# Patient Record
(demographics unavailable — no encounter records)

---

## 2024-12-11 NOTE — ASSESSMENT
[FreeTextEntry1] : # ACC/AHA Stage C, chronic HFrEF  -Etiology: Ischemic - s/p CABG. LVEF 20-25% improved to 45-50% -NYHA class I-II symptoms  -Clinically appears euvolemic w/ warm extremities.  -GDMT: C/w Spironolactone 25mg daily, Toprol XL 50mg daily, and Farxiga 10 mg daily. Plan to start Entresto if repeat labs allow. Pt aware to call the office if SBP<90 or if he experiences LH/dizziness.  -Diuretics: Currently euvolemic off loop diuretic.  -Labs: No recent labs. Repeat labs ordered today.  -Device: Not indicated given EF 45-50% -The patient was educated regarding the diagnosis, treatment and natural progression of heart failure.  -HF education booklet provided to patient. Lifestyle modifications including following a low sodium and fluid restricted diet were discussed.  -Educated regarding importance of checking daily weight and keeping a log. Instructed to notify our office for weight gain >2lbs in 24hrs or 5lbs in 5 days. -Regular physical activity is encouraged as tolerated.  -Plan to order repeat TTE at next visit -Pt is currently enrolled in cardiac rehab at [facility] (started late October 2024) -He has resumed working part time as a [employer]  # CAD -Denies angina -C/w daily BB, ASA and high intensity statin therapy as prescribed -Mgmt per Dr. Rowley ([facility])  # Embolic CVA/LV thrombus -AC: Eliquis was discontinued by Dr. Rowley in September 2024 -Follows with hematology, Factor V Leiden negative   # NIDDM -on SGLT2i -He will repeat endocrine testing in January -Will f/u with Endocrine for continued management.  RTO in 3 months with Dr. Roque.

## 2024-12-11 NOTE — DISCUSSION/SUMMARY
[FreeTextEntry1] : 58 y/o male with recently diagnosed HFrEF (LVEF 20-25%), multivessel CAD, LV thrombus, CVA, NIDDM, former smoker who was referred for evaluation of his heart failure.  RHC performed on 4/23/24 demonstrated low filling pressures and preserved cardiac index.  He underwent CABG x 4 and removal of LV mass on 6/26/24. Clinically he appears euvolemic on exam w/ warm extremities. His LVEF improved to 45-50% post CABG. Will optimize GDMT as tolerated. Plan as above.  [EKG obtained to assist in diagnosis and management of assessed problem(s)] : EKG obtained to assist in diagnosis and management of assessed problem(s)

## 2024-12-11 NOTE — PHYSICAL EXAM
[Moves all extremities] : moves all extremities [Alert and Oriented] : alert and oriented [Well Developed] : well developed [No Acute Distress] : no acute distress [Normal Venous Pressure] : normal venous pressure [Normal S1, S2] : normal S1, S2 [No Murmur] : no murmur [No Rub] : no rub [Clear Lung Fields] : clear lung fields [No Respiratory Distress] : no respiratory distress  [Soft] : abdomen soft [No Masses/organomegaly] : no masses/organomegaly [de-identified] : did not assess gait [de-identified] : RLE edema [de-identified] : warm, dry

## 2024-12-11 NOTE — ASSESSMENT
[FreeTextEntry1] : # ACC/AHA Stage C, chronic HFrEF  -Etiology: Ischemic - s/p CABG. LVEF 20-25% improved to 45-50% -NYHA class I-II symptoms  -Clinically appears euvolemic w/ warm extremities.  -GDMT: C/w Spironolactone 25mg daily, Toprol XL 50mg daily, and Farxiga 10 mg daily. Plan to start Entresto if repeat labs allow. Pt aware to call the office if SBP<90 or if he experiences LH/dizziness.  -Diuretics: Currently euvolemic off loop diuretic.  -Labs: No recent labs. Repeat labs ordered today.  -Device: Not indicated given EF 45-50% -The patient was educated regarding the diagnosis, treatment and natural progression of heart failure.  -HF education booklet provided to patient. Lifestyle modifications including following a low sodium and fluid restricted diet were discussed.  -Educated regarding importance of checking daily weight and keeping a log. Instructed to notify our office for weight gain >2lbs in 24hrs or 5lbs in 5 days. -Regular physical activity is encouraged as tolerated.  -Plan to order repeat TTE at next visit -Pt is currently enrolled in cardiac rehab at [facility] (started late October 2024) -He has resumed working part time as a [email or web address]  # CAD -Denies angina -C/w daily BB, ASA and high intensity statin therapy as prescribed -Mgmt per Dr. Rowley ([facility])  # Embolic CVA/LV thrombus -AC: Eliquis was discontinued by Dr. Rowley in September 2024 -Follows with hematology, Factor V Leiden negative   # NIDDM -on SGLT2i -He will repeat endocrine testing in January -Will f/u with Endocrine for continued management.  RTO in 3 months with Dr. Roque.

## 2024-12-11 NOTE — CARDIOLOGY SUMMARY
[de-identified] : 12/10/24: SR 66 bpm, unchanged from prior on 6/29/24. 6/29/24: SR 66 bpm, PRWP, diffuse T wave flattening  4/5/24: SR 87 bpm, old anteroseptal MI [de-identified] : 9/14/24 TTE: LVEF 45-50%, RV normal size/function, RVSP 13mmHg with RAP 3mmHg, trace MR and TR, TAPSE 1.7cm. Limited TTE 7/1/24: LVEF 35-40%, mural LV thrombus noted.   Intra Op FIDEL 6/26/24: LVEF 40-45%, normal RV size/function. There is a well circumscribed, a pedunculated and a calcified an intracavity left ventricular mass located in the apex, measuring 10 mm X 10 mm X 13 mm, that enhances with Definity similar to adjacent myocardium suggestive of a vascularized cardiac tumor.  TTE 6/25/24: LVEF 30-35%, LVOT VTI 16.16 cm, grade I DD, normal RV size/function, no significant VHD.  There is a sessile, rounded and fixed left ventricular thrombus located in the apex. The thrombus measures 1.0 x 1.0 cm.  TTE 1/29/24: LVEF 30-35%, LVIDd 4.8cm, LVOT VTI 16.9cm, grade I DD, normal RV size/function, no significant VHD, LV apical thrombus (1.6cmX1.3cm), akinesis of the mid anteroseptal and apical myocardium.  FIDEL 3/20/24: LVEF 20-25%, normal biatria, no significant valvular disease, no pericardial effusion. There is a sessile, rounded and immobile left ventricular thrombus located in the apex ([id number]).  [de-identified] : [facility] 4/23/24: HR 70, RA 3, RV 22/5, PAP 21/8/13, PCWP 6, PA sat 68.6%, PVR 1.64wu, CO 4.28/CI 2.21.

## 2024-12-11 NOTE — REASON FOR VISIT
[Cardiac Failure] : cardiac failure [Spouse] : spouse [FreeTextEntry3] : Dr. Patiño/Dr. Rowley [FreeTextEntry1] : CTS: Dr. Patiño Primary Cardiologist: Dr. Rowley ([facility]) HF: Dr. Roque Endocrinologist: Dr. Sandoval [phone]

## 2024-12-11 NOTE — REVIEW OF SYSTEMS
[FreeTextEntry1] :  The remaining 14-point ROS is otherwise negative or non contributory except as noted in the HPI.

## 2024-12-11 NOTE — HISTORY OF PRESENT ILLNESS
[FreeTextEntry1] : 60 y/o male with recently diagnosed HFrEF (LVEF 20-25%), multivessel CAD, LV thrombus, now s/p CABG x4 and removal of LV mass on 6/26/24, CVA, NIDDM, former smoker who presents today for scheduled follow-up. His most recent TTE from 9/12/24 showed an improvement in his LVEF to 45-50%.  He was hospitalized 1/27-2/7/24 at [facility] where he initially presented with expressive aphasia. He was diagnosed with ischemic stroke involving the left and right MA distribution. TTE revealed systolic dysfunction (LVEF 30-35%) and presence of LV thrombus. LHC showed severe triple vessel disease. He was discharged home w/ a lifevest and on low dose Entresto and Toprol XL 25 mg daily which was later increased to 50mg daily by primary Cardiologist.   He underwent RHC on 4/23/24 which revealed low filling pressures and borderline CI (2.2).  He underwent CABG x 4 and removal of LV mass on 6/26/24. He required tMCS with IABP perioperatively. He was discharged home 7/1/24. He has been doing well since then.   The patient returns today for a follow up and feels well overall. He returned to work part time as a . He endorses occasional BLLE edema in the legs since CABG. This usually occurs after standing for several hours at work, more significant in RLE. This resolves after he elevates his legs for several minutes. He is trying to gain back some weight that he has lost over the past several months. He denies overt HF symptoms specifically CP, palpitations, SOB, dizziness/LH, orthopnea and PND. No recent hospitalizations or ED visits.

## 2024-12-11 NOTE — HISTORY OF PRESENT ILLNESS
[FreeTextEntry1] : 58 y/o male with recently diagnosed HFrEF (LVEF 20-25%), multivessel CAD, LV thrombus, now s/p CABG x4 and removal of LV mass on 6/26/24, CVA, NIDDM, former smoker who presents today for scheduled follow-up. His most recent TTE from 9/12/24 showed an improvement in his LVEF to 45-50%.  He was hospitalized 1/27-2/7/24 at [facility] where he initially presented with expressive aphasia. He was diagnosed with ischemic stroke involving the left and right MA distribution. TTE revealed systolic dysfunction (LVEF 30-35%) and presence of LV thrombus. LHC showed severe triple vessel disease. He was discharged home w/ a lifevest and on low dose Entresto and Toprol XL 25 mg daily which was later increased to 50mg daily by primary Cardiologist.   He underwent RHC on 4/23/24 which revealed low filling pressures and borderline CI (2.2).  He underwent CABG x 4 and removal of LV mass on 6/26/24. He required tMCS with IABP perioperatively. He was discharged home 7/1/24. He has been doing well since then.   The patient returns today for a follow up and feels well overall. He returned to work part time as a . He endorses occasional BLLE edema in the legs since CABG. This usually occurs after standing for several hours at work, more significant in RLE. This resolves after he elevates his legs for several minutes. He is trying to gain back some weight that he has lost over the past several months. He denies overt HF symptoms specifically CP, palpitations, SOB, dizziness/LH, orthopnea and PND. No recent hospitalizations or ED visits.

## 2024-12-11 NOTE — PHYSICAL EXAM
[Moves all extremities] : moves all extremities [Alert and Oriented] : alert and oriented [Well Developed] : well developed [No Acute Distress] : no acute distress [Normal Venous Pressure] : normal venous pressure [Normal S1, S2] : normal S1, S2 [No Murmur] : no murmur [No Rub] : no rub [Clear Lung Fields] : clear lung fields [No Respiratory Distress] : no respiratory distress  [Soft] : abdomen soft [No Masses/organomegaly] : no masses/organomegaly [de-identified] : did not assess gait [de-identified] : RLE edema [de-identified] : warm, dry

## 2024-12-11 NOTE — REASON FOR VISIT
[Cardiac Failure] : cardiac failure [Spouse] : spouse [FreeTextEntry3] : Dr. Patiño/Dr. Rowley [FreeTextEntry1] : CTS: Dr. Patiño Primary Cardiologist: Dr. Rowley ([facility]) HF: Dr. Roque Endocrinologist: Dr. Sandoval [phone]

## 2024-12-11 NOTE — DISCUSSION/SUMMARY
[FreeTextEntry1] : 60 y/o male with recently diagnosed HFrEF (LVEF 20-25%), multivessel CAD, LV thrombus, CVA, NIDDM, former smoker who was referred for evaluation of his heart failure.  RHC performed on 4/23/24 demonstrated low filling pressures and preserved cardiac index.  He underwent CABG x 4 and removal of LV mass on 6/26/24. Clinically he appears euvolemic on exam w/ warm extremities. His LVEF improved to 45-50% post CABG. Will optimize GDMT as tolerated. Plan as above.  [EKG obtained to assist in diagnosis and management of assessed problem(s)] : EKG obtained to assist in diagnosis and management of assessed problem(s)

## 2024-12-11 NOTE — HISTORY OF PRESENT ILLNESS
[FreeTextEntry1] : 60 y/o male with recently diagnosed HFrEF (LVEF 20-25%), multivessel CAD, LV thrombus, now s/p CABG x4 and removal of LV mass on 6/26/24, CVA, NIDDM, former smoker who presents today for scheduled follow-up. His most recent TTE from 9/12/24 showed an improvement in his LVEF to 45-50%.  He was hospitalized 1/27-2/7/24 at [facility] where he initially presented with expressive aphasia. He was diagnosed with ischemic stroke involving the left and right MA distribution. TTE revealed systolic dysfunction (LVEF 30-35%) and presence of LV thrombus. LHC showed severe triple vessel disease. He was discharged home w/ a lifevest and on low dose Entresto and Toprol XL 25 mg daily which was later increased to 50mg daily by primary Cardiologist.   He underwent RHC on 4/23/24 which revealed low filling pressures and borderline CI (2.2).  He underwent CABG x 4 and removal of LV mass on 6/26/24. He required tMCS with IABP perioperatively. He was discharged home 7/1/24. He has been doing well since then.   The patient returns today for a follow up and feels well overall. He returned to work part time as a . He endorses occasional BLLE edema in the legs since CABG. This usually occurs after standing for several hours at work, more significant in RLE. This resolves after he elevates his legs for several minutes. He is trying to gain back some weight that he has lost over the past several months. He denies overt HF symptoms specifically CP, palpitations, SOB, dizziness/LH, orthopnea and PND. No recent hospitalizations or ED visits.

## 2024-12-11 NOTE — ASSESSMENT
[FreeTextEntry1] : # ACC/AHA Stage C, chronic HFrEF  -Etiology: Ischemic - s/p CABG. LVEF 20-25% improved to 45-50% -NYHA class I-II symptoms  -Clinically appears euvolemic w/ warm extremities.  -GDMT: C/w Spironolactone 25mg daily, Toprol XL 50mg daily, and Farxiga 10 mg daily. Plan to start Entresto if repeat labs allow. Pt aware to call the office if SBP<90 or if he experiences LH/dizziness.  -Diuretics: Currently euvolemic off loop diuretic.  -Labs: No recent labs. Repeat labs ordered today.  -Device: Not indicated given EF 45-50% -The patient was educated regarding the diagnosis, treatment and natural progression of heart failure.  -HF education booklet provided to patient. Lifestyle modifications including following a low sodium and fluid restricted diet were discussed.  -Educated regarding importance of checking daily weight and keeping a log. Instructed to notify our office for weight gain >2lbs in 24hrs or 5lbs in 5 days. -Regular physical activity is encouraged as tolerated.  -Plan to order repeat TTE at next visit -Pt is currently enrolled in cardiac rehab at [facility] (started late October 2024) -He has resumed working part time as a [employer]  # CAD -Denies angina -C/w daily BB, ASA and high intensity statin therapy as prescribed -Mgmt per Dr. Rowley ([facility])  # Embolic CVA/LV thrombus -AC: Eliquis was discontinued by Dr. Rowley in September 2024 -Follows with hematology, Factor V Leiden negative   # NIDDM -on SGLT2i -He will repeat endocrine testing in January -Will f/u with Endocrine for continued management.  RTO in 3 months with Dr. Roque.

## 2024-12-11 NOTE — CARDIOLOGY SUMMARY
[de-identified] : 12/10/24: SR 66 bpm, unchanged from prior on 6/29/24. 6/29/24: SR 66 bpm, PRWP, diffuse T wave flattening  4/5/24: SR 87 bpm, old anteroseptal MI [de-identified] : 9/14/24 TTE: LVEF 45-50%, RV normal size/function, RVSP 13mmHg with RAP 3mmHg, trace MR and TR, TAPSE 1.7cm. Limited TTE 7/1/24: LVEF 35-40%, mural LV thrombus noted.   Intra Op FIDEL 6/26/24: LVEF 40-45%, normal RV size/function. There is a well circumscribed, a pedunculated and a calcified an intracavity left ventricular mass located in the apex, measuring 10 mm X 10 mm X 13 mm, that enhances with Definity similar to adjacent myocardium suggestive of a vascularized cardiac tumor.  TTE 6/25/24: LVEF 30-35%, LVOT VTI 16.16 cm, grade I DD, normal RV size/function, no significant VHD.  There is a sessile, rounded and fixed left ventricular thrombus located in the apex. The thrombus measures 1.0 x 1.0 cm.  TTE 1/29/24: LVEF 30-35%, LVIDd 4.8cm, LVOT VTI 16.9cm, grade I DD, normal RV size/function, no significant VHD, LV apical thrombus (1.6cmX1.3cm), akinesis of the mid anteroseptal and apical myocardium.  FIDEL 3/20/24: LVEF 20-25%, normal biatria, no significant valvular disease, no pericardial effusion. There is a sessile, rounded and immobile left ventricular thrombus located in the apex ([id number]).  [de-identified] : [facility] 4/23/24: HR 70, RA 3, RV 22/5, PAP 21/8/13, PCWP 6, PA sat 68.6%, PVR 1.64wu, CO 4.28/CI 2.21.

## 2024-12-11 NOTE — END OF VISIT
[Time Spent: ___ minutes] : I have spent [unfilled] minutes of time on the encounter which excludes teaching and separately reported services. [FreeTextEntry4] :  I, Rochelle Medellin, am scribing for and in the presence of Pau Fine in the following sections HISTORY OF PRESENT ILLNESSS, PAST MEDICAL/FAMILY/SOCIAL HISTORY; REVIEW OF SYSTEMS; VITAL SIGNS; PHYSICAL EXAM; ASSESSMENT/PLAN   [FreeTextEntry3] : All medical record entries made by the Scribe were at the direction and personally dictated by the nurse practitioner. I, ADILENE Preciado, have reviewed the chart and agree that the record accurately reflects my personal performance of the history, physical exam, assessment and plan. I have also personally directed, reviewed, and agreed with the chart.

## 2024-12-11 NOTE — CARDIOLOGY SUMMARY
[de-identified] : 12/10/24: SR 66 bpm, unchanged from prior on 6/29/24. 6/29/24: SR 66 bpm, PRWP, diffuse T wave flattening  4/5/24: SR 87 bpm, old anteroseptal MI [de-identified] : 9/14/24 TTE: LVEF 45-50%, RV normal size/function, RVSP 13mmHg with RAP 3mmHg, trace MR and TR, TAPSE 1.7cm. Limited TTE 7/1/24: LVEF 35-40%, mural LV thrombus noted.   Intra Op FIDEL 6/26/24: LVEF 40-45%, normal RV size/function. There is a well circumscribed, a pedunculated and a calcified an intracavity left ventricular mass located in the apex, measuring 10 mm X 10 mm X 13 mm, that enhances with Definity similar to adjacent myocardium suggestive of a vascularized cardiac tumor.  TTE 6/25/24: LVEF 30-35%, LVOT VTI 16.16 cm, grade I DD, normal RV size/function, no significant VHD.  There is a sessile, rounded and fixed left ventricular thrombus located in the apex. The thrombus measures 1.0 x 1.0 cm.  TTE 1/29/24: LVEF 30-35%, LVIDd 4.8cm, LVOT VTI 16.9cm, grade I DD, normal RV size/function, no significant VHD, LV apical thrombus (1.6cmX1.3cm), akinesis of the mid anteroseptal and apical myocardium.  FIDEL 3/20/24: LVEF 20-25%, normal biatria, no significant valvular disease, no pericardial effusion. There is a sessile, rounded and immobile left ventricular thrombus located in the apex ([id number]).  [de-identified] : [facility] 4/23/24: HR 70, RA 3, RV 22/5, PAP 21/8/13, PCWP 6, PA sat 68.6%, PVR 1.64wu, CO 4.28/CI 2.21.

## 2024-12-11 NOTE — REASON FOR VISIT
[Cardiac Failure] : cardiac failure [Spouse] : spouse [FreeTextEntry3] : Dr. Patiño/Dr. Rowley [FreeTextEntry1] : CTS: Dr. Ptaiño Primary Cardiologist: Dr. Rowley ([facility]) HF: Dr. Roque Endocrinologist: Dr. Sandoval [phone]

## 2024-12-11 NOTE — PHYSICAL EXAM
[Moves all extremities] : moves all extremities [Alert and Oriented] : alert and oriented [Well Developed] : well developed [No Acute Distress] : no acute distress [Normal Venous Pressure] : normal venous pressure [Normal S1, S2] : normal S1, S2 [No Murmur] : no murmur [No Rub] : no rub [Clear Lung Fields] : clear lung fields [No Respiratory Distress] : no respiratory distress  [Soft] : abdomen soft [No Masses/organomegaly] : no masses/organomegaly [de-identified] : did not assess gait [de-identified] : RLE edema [de-identified] : warm, dry